# Patient Record
Sex: MALE | Race: ASIAN | Employment: UNEMPLOYED | URBAN - METROPOLITAN AREA
[De-identification: names, ages, dates, MRNs, and addresses within clinical notes are randomized per-mention and may not be internally consistent; named-entity substitution may affect disease eponyms.]

---

## 2024-08-05 ENCOUNTER — HOSPITAL ENCOUNTER (EMERGENCY)
Age: 29
Discharge: HOME | End: 2024-08-05
Attending: EMERGENCY MEDICINE

## 2024-08-05 VITALS
RESPIRATION RATE: 16 BRPM | DIASTOLIC BLOOD PRESSURE: 84 MMHG | OXYGEN SATURATION: 100 % | WEIGHT: 205 LBS | BODY MASS INDEX: 30.36 KG/M2 | HEART RATE: 67 BPM | SYSTOLIC BLOOD PRESSURE: 155 MMHG | HEIGHT: 69 IN | TEMPERATURE: 98.4 F

## 2024-08-05 DIAGNOSIS — K02.9 DENTAL CARIES: Primary | ICD-10-CM

## 2024-08-05 DIAGNOSIS — K08.89 PAIN, DENTAL: ICD-10-CM

## 2024-08-05 PROCEDURE — 99283 EMERGENCY DEPT VISIT LOW MDM: CPT | Performed by: EMERGENCY MEDICINE

## 2024-08-05 PROCEDURE — 6370000000 HC RX 637 (ALT 250 FOR IP): Performed by: EMERGENCY MEDICINE

## 2024-08-05 RX ORDER — IBUPROFEN 800 MG/1
800 TABLET ORAL ONCE
Status: COMPLETED | OUTPATIENT
Start: 2024-08-05 | End: 2024-08-05

## 2024-08-05 RX ORDER — TRAMADOL HYDROCHLORIDE 50 MG/1
50 TABLET ORAL ONCE
Status: COMPLETED | OUTPATIENT
Start: 2024-08-05 | End: 2024-08-05

## 2024-08-05 RX ORDER — IBUPROFEN 800 MG/1
800 TABLET ORAL EVERY 6 HOURS PRN
Qty: 120 TABLET | Refills: 1 | Status: SHIPPED | OUTPATIENT
Start: 2024-08-05

## 2024-08-05 RX ORDER — AMOXICILLIN 500 MG/1
500 CAPSULE ORAL 3 TIMES DAILY
Qty: 21 CAPSULE | Refills: 0 | Status: SHIPPED | OUTPATIENT
Start: 2024-08-05 | End: 2024-08-12

## 2024-08-05 RX ORDER — AMOXICILLIN 250 MG/1
500 CAPSULE ORAL ONCE
Status: COMPLETED | OUTPATIENT
Start: 2024-08-05 | End: 2024-08-05

## 2024-08-05 RX ADMIN — IBUPROFEN 800 MG: 800 TABLET ORAL at 23:04

## 2024-08-05 RX ADMIN — TRAMADOL HYDROCHLORIDE 50 MG: 50 TABLET ORAL at 23:04

## 2024-08-05 RX ADMIN — AMOXICILLIN 500 MG: 250 CAPSULE ORAL at 23:04

## 2024-08-05 ASSESSMENT — ENCOUNTER SYMPTOMS
ABDOMINAL PAIN: 0
SORE THROAT: 0
VOMITING: 0
DIARRHEA: 0
STRIDOR: 0
COUGH: 0
SHORTNESS OF BREATH: 0
EYE PAIN: 0
NAUSEA: 0
CONSTIPATION: 0
EYE DISCHARGE: 0
COLOR CHANGE: 0
WHEEZING: 0
EYE REDNESS: 0

## 2024-08-05 ASSESSMENT — PAIN SCALES - GENERAL: PAINLEVEL_OUTOF10: 9

## 2024-08-05 ASSESSMENT — PAIN - FUNCTIONAL ASSESSMENT: PAIN_FUNCTIONAL_ASSESSMENT: 0-10

## 2024-08-06 NOTE — ED PROVIDER NOTES
[facility] EMERGENCY DEPARTMENT  EMERGENCY DEPARTMENT ENCOUNTER      Pt Name: Butch Monreal  MRN: [id number]  Birthdate 1995  Date of evaluation: 8/5/2024  Provider: Ev Urbano MD    CHIEF COMPLAINT       Chief Complaint   Patient presents with    Dental Pain     R lower x3 days, tylenol does not help, pt difficulty sleeping, no difficulty swallowing or breathing, denies headache       HISTORY OF PRESENT ILLNESS  (Location/Symptom, Timing/Onset, Context/Setting, Quality, Duration, Modifying Factors, Severity.)   Butch Monreal is a 29 y.o. male who presents to the emergency department complaining of right lower tooth pain for the last 3 days.  Is been using Tylenol which today has really not helped much.  He said he really has no headache or fever that he is aware of.  But he sees a black spot on his tooth so he is thinking there might be a problem there.  He has not seen a dentist lately.  He does not have a dentist currently.  No facial swelling.  No problems swallowing.  No shortness of breath or chest pain.  No abdominal pain nausea or vomiting.    Nursing Notes were reviewed.    REVIEW OF SYSTEMS    (2-9 systems for level 4, 10 or more for level 5)     Review of Systems   Constitutional:  Negative for activity change, appetite change, chills, fatigue and fever.   HENT:  Positive for dental problem. Negative for congestion, ear pain and sore throat.    Eyes:  Negative for pain, discharge and redness.   Respiratory:  Negative for cough, shortness of breath, wheezing and stridor.    Cardiovascular:  Negative for chest pain.   Gastrointestinal:  Negative for abdominal pain, constipation, diarrhea, nausea and vomiting.   Genitourinary:  Negative for decreased urine volume and difficulty urinating.   Musculoskeletal:  Negative for arthralgias and myalgias.   Skin:  Negative for color change and rash.   Neurological:  Negative for dizziness, weakness and headaches.   Psychiatric/Behavioral: